# Patient Record
(demographics unavailable — no encounter records)

---

## 2024-11-04 NOTE — REVIEW OF SYSTEMS
[Feeling Tired] : feeling tired [Recent Weight Gain (___ Lbs)] : recent [unfilled] ~Ulb weight gain [Abdominal Pain] : abdominal pain [Vomiting] : vomiting [Diarrhea] : diarrhea [Negative] : Heme/Lymph

## 2024-11-04 NOTE — REASON FOR VISIT
[Follow-up] : a follow-up of an existing diagnosis [FreeTextEntry1] : recent colitis diagnosed in the emergency room

## 2024-11-26 NOTE — ASSESSMENT
[FreeTextEntry1] : Likely #Subungal hemorrhage, R and L 1st toes ddx also includes onychomycosis, but significantly lower on ddx as no subungal debris, xanthonychia or onychodystrophy chronic, improving - Discussed the likely nature and anticipated natural course of these benign lesions - Reassurance - Photos taken for monitoring; reviewed photos patient took on phone from about 2 months ago, appears as though lesion on R 1st toe has moved more distally nail plate relative to prior photo -- reassuring - Nail clipping obtained today to r/o onychomycosis out of precaution - Follow up 6 months for re-eval or sooner PRN if changing in morphology  #Seborrheic dermatitis, flare on ears  Chronic condition; flaring  - Reviewed the benign nature but chronic nature of this condition  - CONTINUE ketoconazole 2% shampoo to scalp, apply 2-3x per week, leave in for 5 mins at a time and rinse  - start keto cream on ears daily (also can use around nose). If no improvement at NV, can add in a TCS but patient nervous to use steroids  #Post-surgical scar, breasts chronic, stable - Discussed the likely nature and anticipated natural course - Reassurance - Recommended continuing with silicone scar sheets daily unless directed otherwise by patient's primary breast surgeon   Patient may follow up in 6 months, or sooner PRN if any changes, new or growing lesions

## 2024-11-26 NOTE — HISTORY OF PRESENT ILLNESS
[FreeTextEntry1] : RPV: spots of concern on toenails [de-identified] : Ms. ELISSA BROWN is a 38 year old F who presents for:   1. Spots of concern on toenails: non-itchy, non-painful spots of unspecified age, first noted >2 months ago which patient would like to have evaluated. Noted after removal of artificial nail 2. f/u seb derm: using ketoconazole with improvement  Skin Cancer Hx: No personal history of skin cancer

## 2024-11-26 NOTE — PHYSICAL EXAM
[FreeTextEntry3] : Focused skin exam performed  The relevant portions of the exam were performed today  AAOx3, NAD, well-appearing / pleasant Focused examination within normal limits with the exception of: - On the R 1st toe, an area of light-brown hyperpigmentation on the mid R lateral nail plate without any onychodystrophy; dermoscopy with yellow red globules; Distal onychoschizia - On the L 1st toe, an area of light-brown hyperpigmentation with globules - scalp improved but ears with scale - thin longitudinal hyperpigmented surgical scars on the b/l breasts

## 2024-11-29 NOTE — REVIEW OF SYSTEMS
[Diarrhea: Grade 0] : Diarrhea: Grade 0 [Joint Pain] : no joint pain [Joint Stiffness] : no joint stiffness [Muscle Pain] : no muscle pain [Muscle Weakness] : no muscle weakness [Skin Rash] : no skin rash [Skin Wound] : no skin wound [Negative] : Allergic/Immunologic [FreeTextEntry9] : pain over the scapula L  [de-identified] : occasional pain over the L axilla

## 2024-11-29 NOTE — REVIEW OF SYSTEMS
[Diarrhea: Grade 0] : Diarrhea: Grade 0 [Joint Pain] : no joint pain [Joint Stiffness] : no joint stiffness [Muscle Pain] : no muscle pain [Muscle Weakness] : no muscle weakness [Skin Rash] : no skin rash [Skin Wound] : no skin wound [Negative] : Allergic/Immunologic [FreeTextEntry9] : pain over the scapula L  [de-identified] : occasional pain over the L axilla

## 2024-11-29 NOTE — HISTORY OF PRESENT ILLNESS
[Disease: _____________________] : Disease: [unfilled] [T: ___] : T[unfilled] [N: ___] : N[unfilled] [AJCC Stage: ____] : AJCC Stage: [unfilled] [de-identified] : Age 37: left breast cancer Palpable breast mass: she had mammogram / sonogram done on 10/2023 which showed indeterminate calcifications in the left breast and multiple cysts of the upper outer quadrant measuring 1.6 x 1.3 x 1.8 cm correlating to pt's palpable area of concern. She had L upper outer breast cores biopsy done on 10/12/23 which showed invasive carcinoma with mixed ductal and lobular features, SBR 6/9, ER 80%, [place] 80%, Her2 negative (1). She underwent right prophylactic mastectomy and left mastectomy with SLNB with Dr Smith on 11/20/2023. The pathology showed left breast multifocal invasive moderately differentiated mammary carcinoma with ductal and lobular features: grade 2, measuring 11 mm, 9mm and 5 mm, negative SLN (0/2), and DCIS measuring 5 mm. Oncotype score was 15 which predicted 9 year distant recurrence of 4% on endocrine therapy.  [de-identified] : multifocal invasive moderately differentiated mammary carcinoma with ductal and lobular features ER 80%, [place] 80%, Her2 negativeand DCIS [de-identified] : [facility] genetic testing done on 10/2023: 9 gene panel: no actionable mutations tamoxifen 5/2024 (stopped due to cramping sensation and planned plastic surgery) restart 11/2024  [de-identified] : Since last evaluation, she has not resumed tamoxifen: plastic surgery was supposed to be October, then moved to November and then moved January 17th. She is worried about cramping sensation that she experienced previously. She did get magnesium supplement to help with this. Had E coli gastroenteritis after return from travel from work: had CT of the abd/ pelvis done 10/2024 and blood work. Currently resolved. She has occasional tenderness over the L axilla. Has pain over the L scapula: happened after exercise at the gym. Saw PCP who obtained Xrays and felt has not improved. PT was recommended but was not able to coordinate with work schedule.

## 2024-11-29 NOTE — PHYSICAL EXAM
[Fully active, able to carry on all pre-disease performance without restriction] : Status 0 - Fully active, able to carry on all pre-disease performance without restriction [Normal] : affect appropriate [de-identified] : B mastectomy with expander reconstruction; no palpable axillary LN  [de-identified] : tenderness on palpation over the L trapezius muscle: able to abduct LUE and adduct without any limitations

## 2024-11-29 NOTE — ASSESSMENT
[FreeTextEntry1] : She is a 37 y/o  BRCA negative F with left breast Stage I (T1N0) multifocal invasive moderately differentiated mammary carcinoma with ductal and lobular features ER 80%, SC 80%, Her2 negative s/p L mastectomy and SLNB along with R prophylactic mastectomy. We reviewed rationale for tamoxifen/ endocrine therapy and reviewed her concerns. Given plastic surgery has been delayed again, we reviewed restarting tamoxifen now and seeing how her body is tolerating therapy with magnesium support. We reviewed holding the tamoxifen 2 weeks prior to plastic surgery as abundance of caution to decrease any blood thickness risk. Questions answered to her satisfaction. She is agreeable with plan. We reviewed CT abd/ pelvis and blood work done in ED 10/2024 for colitis. Next follow up in 4 months but earlier if any new symptoms.

## 2024-11-29 NOTE — PHYSICAL EXAM
[Fully active, able to carry on all pre-disease performance without restriction] : Status 0 - Fully active, able to carry on all pre-disease performance without restriction [Normal] : affect appropriate [de-identified] : B mastectomy with expander reconstruction; no palpable axillary LN  [de-identified] : tenderness on palpation over the L trapezius muscle: able to abduct LUE and adduct without any limitations

## 2024-11-29 NOTE — ASSESSMENT
[FreeTextEntry1] : She is a 37 y/o  BRCA negative F with left breast Stage I (T1N0) multifocal invasive moderately differentiated mammary carcinoma with ductal and lobular features ER 80%, [place] 80%, Her2 negative s/p L mastectomy and SLNB along with R prophylactic mastectomy. We reviewed rationale for tamoxifen/ endocrine therapy and reviewed her concerns. Given plastic surgery has been delayed again, we reviewed restarting tamoxifen now and seeing how her body is tolerating therapy with magnesium support. We reviewed holding the tamoxifen 2 weeks prior to plastic surgery as abundance of caution to decrease any blood thickness risk. Questions answered to her satisfaction. She is agreeable with plan. We reviewed CT abd/ pelvis and blood work done in ED 10/2024 for colitis. Next follow up in 4 months but earlier if any new symptoms.

## 2024-11-29 NOTE — HISTORY OF PRESENT ILLNESS
[Disease: _____________________] : Disease: [unfilled] [T: ___] : T[unfilled] [N: ___] : N[unfilled] [AJCC Stage: ____] : AJCC Stage: [unfilled] [de-identified] : Age 37: left breast cancer Palpable breast mass: she had mammogram / sonogram done on 10/2023 which showed indeterminate calcifications in the left breast and multiple cysts of the upper outer quadrant measuring 1.6 x 1.3 x 1.8 cm correlating to pt's palpable area of concern. She had L upper outer breast cores biopsy done on 10/12/23 which showed invasive carcinoma with mixed ductal and lobular features, SBR 6/9, ER 80%, [place] 80%, Her2 negative (1). She underwent right prophylactic mastectomy and left mastectomy with SLNB with Dr Smith on 11/20/2023. The pathology showed left breast multifocal invasive moderately differentiated mammary carcinoma with ductal and lobular features: grade 2, measuring 11 mm, 9mm and 5 mm, negative SLN (0/2), and DCIS measuring 5 mm. Oncotype score was 15 which predicted 9 year distant recurrence of 4% on endocrine therapy.  [de-identified] : multifocal invasive moderately differentiated mammary carcinoma with ductal and lobular features ER 80%, CT 80%, Her2 negativeand DCIS [de-identified] : [facility] genetic testing done on 10/2023: 9 gene panel: no actionable mutations tamoxifen 5/2024 (stopped due to cramping sensation and planned plastic surgery) restart 11/2024  [de-identified] : Since last evaluation, she has not resumed tamoxifen: plastic surgery was supposed to be October, then moved to November and then moved January 17th. She is worried about cramping sensation that she experienced previously. She did get magnesium supplement to help with this. Had E coli gastroenteritis after return from travel from work: had CT of the abd/ pelvis done 10/2024 and blood work. Currently resolved. She has occasional tenderness over the L axilla. Has pain over the L scapula: happened after exercise at the gym. Saw PCP who obtained Xrays and felt has not improved. PT was recommended but was not able to coordinate with work schedule.

## 2024-11-29 NOTE — REASON FOR VISIT
[Follow-Up Visit] : a follow-up [FreeTextEntry2] : follow up for breast cancer: has resumed tamoxifen

## 2025-01-21 NOTE — HISTORY OF PRESENT ILLNESS
[FreeTextEntry1] :  Pt is a 38 year female s/p bilateral breast revision with nipple reconstruction and liposuction. DOS 1/17/25. Pt doing well. She reported feeling sore along her abdomen. Currently taking PO abx. No further complaints at this time. Denies fever, chills, pain, rash.

## 2025-01-31 NOTE — HISTORY OF PRESENT ILLNESS
[FreeTextEntry1] :  Pt is a 38 year female s/p bilateral breast revision with nipple reconstruction and liposuction. DOS 1/17/25. Patient reported noticing small opening along her left nipple incision and using mupirocin ointment on the area. No further complaints at this time. Denies fever, chills, pain, rash.

## 2025-01-31 NOTE — PHYSICAL EXAM
[de-identified] : bilateral breast implants in place, bilateral breast incision with prineo in place, left nipple incision mostly healed with small dehiscence, no drainage/bleeding noted, no sign of gross infection, post op swelling noted, liposuction incision along bilateral IMF healed

## 2025-02-04 NOTE — ASSESSMENT
[FreeTextEntry1] : will observe.  bloods drawn. to call next week for results. sonogram next visit. to return earlier if any change. patient has been given the opportunity to ask questions, and all of the patient's questions have been answered to their satisfaction

## 2025-02-04 NOTE — HISTORY OF PRESENT ILLNESS
[de-identified] : prior evaluation of thyroid nodule. cytologically benign. denies dysphagia, hoarseness or new lesions. ongoing breast reconstruction since last visit. I have reviewed all old and new data and available images.  recent sonogram stable.

## 2025-02-04 NOTE — PHYSICAL EXAM
[de-identified] : 3  cm left thyroid nodule, well circumscribed and mobile [Laryngoscopy Performed] : laryngoscopy was performed, see procedure section for findings [Midline] : located in midline position [Normal] : orientation to person, place, and time: normal

## 2025-02-24 NOTE — CONSULT LETTER
[Dear  ___] : Dear  [unfilled], [Consult Letter:] : I had the pleasure of evaluating your patient, [unfilled]. [Please see my note below.] : Please see my note below. [Consult Closing:] : Thank you very much for allowing me to participate in the care of this patient.  If you have any questions, please do not hesitate to contact me. [Sincerely,] : Sincerely, [FreeTextEntry3] : Bridgett Smith MD Breast Surgeon Division of Surgical Oncology Department of Surgery [address] [place] [address]  Tel: [phone] Fax: [phone] Email: [email or web address]

## 2025-02-24 NOTE — PAST MEDICAL HISTORY
[Menstruating] : The patient is menstruating [Menarche Age ____] : age at menarche was [unfilled] [History of Hormone Replacement Treatment] : has no history of hormone replacement treatment [Definite ___ (Date)] : the last menstrual period was [unfilled] [Regular Cycle Intervals] : have been regular [Total Preg ___] : G[unfilled] [Live Births ___] : P[unfilled]  [Age At Live Birth ___] : Age at live birth: [unfilled] [FreeTextEntry6] : none [FreeTextEntry7] : few months in the past, not currently [FreeTextEntry8] : 2 weeks

## 2025-02-24 NOTE — ASSESSMENT
[FreeTextEntry1] : Ms. ELISSA BROWN is a 38-year-old woman, referred for consultation by Dr. Trina Lino for Left breast invasive carcinoma, ductal and lobular, ER 80%, MT 80%, HER2 1+, s/p B/L skin-sparing mastectomies and L SLNB on 11/20/2023, here for a follow-up visit.   11/20/2023 s/p bilateral skin-sparing mastectomies and L SLNB, plastic reconstruction (TE) by Dr. Celestine Wahl. -R: benign -L :Multifocal IDC (3 foci: 11 mm, 9 mm, 5 mm), moderately differentiated (G2), ER+(71-80%), [place]+ (71-80%), HER2-(1+), DCIS (5 mm), LVI-. -L: 2 benign SLN -L: margins negative of invasive carcinoma and DCIS ER 80%, [place] 80%, HER2 1+ -pT1cN0, AJCC stage IA Oncotype DX 15  Had B/L revision w/ liposuction 5/14/2024 w/ Dr. Wahl Resumed Tamoxifen 11/2024 w/ Dr. Joyner Had bilateral breast revision with nipple reconstruction and liposuction 1/17/2025  Exam today ______  Plan: - F/u with PRS & med onc -RTO 6 months

## 2025-02-24 NOTE — HISTORY OF PRESENT ILLNESS
[FreeTextEntry1] : Ms. ELISSA BROWN is a 38-year-old woman, referred for consultation by Dr. Trina Lino for Left breast invasive carcinoma, ductal and lobular, ER 80%, [place] 80%, HER2 1+, s/p B/L skin-sparing mastectomies and L SLNB on 2023, here for a follow-up visit.   She is accompanied by her mother Sejal Figueroa and her aunt Claudette Thompson.  Prior history: The patient reports that she noted left breast pain over the summer along with a vague feeling of a lump in the area. She noted that it was too tender to really examine herself. She denies any visually obvious changes to the breast--denies any redness, rash, skin change, nipple change, or nipple discharge. She has never had any breast problems in the past.   Imaging: 10/05/2023 B/L DM (NW) heterogeneously dense breasts   - L UOQ innumerable calcs, layering, clustered, some with distortion -> stereo BX  - L UOQ multiple partially circumscribed masses (corresponds to cyst-palp)  - R neg  10/05/2023 B/L US  - R 10:00 N3 3 x 6 x 7 mm cyst with debris  - L UOQ multiple cyst 3:00 N3 1.6 x 1.3 x 1.8 cm cyst (palp)  - BR4B  10/12/2023 L stereo Bx  - L UOQ (tophat): invasive carcinoma with mixed ductal and lobular, G2; DCIS G2. ER 80%, [place] 80%, HER2 1+  PMH: FM, back pain PSH:  Meds: denies ALL: Aleve and Advil (brand name)-- but takes ibuprofen without any issue. SH: No tobacco. 2 EtOH/month FH: Maternal aunt breast cancer age 40s. Mat great aunt breast cancer unknown age. No other cancers GYN: Menarche 15. LMP 10/24/23, regular. . Age at first full-term pregnancy 27. BF x 2 weeks. OCP few months. Fert none. HRT none.  10/27/2023 Germline genetic testing ([facility])- negative  2023 Breast MRI (NW) -L bx proven malignancy with extensive enhancement extending from mid central to inferior outer spanning 10.3 cm, located 7 mm from the NAC and 2 cm from the chest wall -Mildy prominent bilateral axillary LNs -R neg -BR6  2023 s/p bilateral skin-sparing mastectomies and L SLNB, plastic reconstruction (TE) by Dr. Celestine Wahl. -R: benign -L breast: Multifocal IDC (3 foci: 11 mm, 9 mm, 5 mm), moderately differentiated (G2), ER+(71-80%), [place]+ (71-80%), HER2-(1+), DCIS (5 mm), LVI-. -L: 2 benign SLN -L: margins negative of invasive carcinoma and DCIS ER 80%, [place] 80%, HER2 1+ -pT1cN0, AJCC stage IA Oncotype DX 15  2023 (postop): The patient is doing well. No longer taking pain medications. Drains removed by PRS yesterday. No fevers/chills.  2024: She met with Dr. Franklyn Joyner from med onc and has not yet started on Tamoxifen (Oncotype DX 15). She is waiting to hear from plastics surgery regarding operative plan. Breasts are expanded to target size. Denies any breast pain or lumps.  2025: Has not yet started tamoxifen. She was previously taking 3x per week and noticed period cramping. She stopped after taking a few weeks. Recently met with Dr Joyner and was recommended to restart. Will plan to restart again soon. She is awaiting f/u with PRS for nipple reconstruction.  Interval History: She resumed Tamoxifen 2024 and is now s/p bilateral breast revision with nipple reconstruction and liposuction 2025.

## 2025-03-05 NOTE — ASSESSMENT
[FreeTextEntry1] : Ms. ELISSA BROWN is a 38-year-old woman, referred for consultation by Dr. Trina Lino for Left breast invasive carcinoma, ductal and lobular, ER 80%, [place] 80%, HER2 1+, s/p B/L skin-sparing mastectomies and L SLNB on 11/20/2023, here for a follow-up visit.   11/20/2023 s/p bilateral skin-sparing mastectomies and L SLNB, plastic reconstruction (TE) by Dr. Celestine Wahl. -R: benign -L :Multifocal IDC (3 foci: 11 mm, 9 mm, 5 mm), moderately differentiated (G2), ER+(71-80%), [place]+ (71-80%), HER2-(1+), DCIS (5 mm), LVI-. -L: 2 benign SLN -L: margins negative of invasive carcinoma and DCIS ER 80%, [place] 80%, HER2 1+ -pT1cN0, AJCC stage IA Oncotype DX 15  Had B/L revision w/ liposuction 5/14/2024 w/ Dr. Wahl Resumed Tamoxifen 11/2024 w/ Dr. Joyner Had bilateral breast revision with nipple reconstruction and liposuction 1/17/2025  Exam today no masses or lymphadenopathy   Plan: - F/u med onc, restart tamoxifen - f/u PRS for nipple tattoo - RTO 6 months

## 2025-03-05 NOTE — CONSULT LETTER
[Courtesy Letter:] : I had the pleasure of seeing your patient, [unfilled], in my office today. [FreeTextEntry3] : Bridgett Smith MD Breast Surgeon Division of Surgical Oncology Department of Surgery [address] [place] [address]  Tel: [phone] Fax: [phone] Email: [email or web address]

## 2025-03-05 NOTE — HISTORY OF PRESENT ILLNESS
[FreeTextEntry1] : Ms. ELISSA BROWN is a 38-year-old woman, referred for consultation by Dr. Trina Lino for Left breast invasive carcinoma, ductal and lobular, ER 80%, [place] 80%, HER2 1+, s/p B/L skin-sparing mastectomies and L SLNB on 2023, here for a follow-up visit.   She is accompanied by her mother Sejal Figueroa and her aunt Claudette Thompson.  Prior history: The patient reports that she noted left breast pain over the summer along with a vague feeling of a lump in the area. She noted that it was too tender to really examine herself. She denies any visually obvious changes to the breast--denies any redness, rash, skin change, nipple change, or nipple discharge. She has never had any breast problems in the past.   Imaging: 10/05/2023 B/L DM (NW) heterogeneously dense breasts   - L UOQ innumerable calcs, layering, clustered, some with distortion -> stereo BX  - L UOQ multiple partially circumscribed masses (corresponds to cyst-palp)  - R neg  10/05/2023 B/L US  - R 10:00 N3 3 x 6 x 7 mm cyst with debris  - L UOQ multiple cyst 3:00 N3 1.6 x 1.3 x 1.8 cm cyst (palp)  - BR4B  10/12/2023 L stereo Bx  - L UOQ (tophat): invasive carcinoma with mixed ductal and lobular, G2; DCIS G2. ER 80%, [place] 80%, HER2 1+  PMH: FM, back pain PSH:  Meds: denies ALL: Aleve and Advil (brand name)-- but takes ibuprofen without any issue. SH: No tobacco. 2 EtOH/month FH: Maternal aunt breast cancer age 40s. Mat great aunt breast cancer unknown age. No other cancers GYN: Menarche 15. LMP 10/24/23, regular. . Age at first full-term pregnancy 27. BF x 2 weeks. OCP few months. Fert none. HRT none.  10/27/2023 Germline genetic testing ([facility])- negative  2023 Breast MRI (NW) -L bx proven malignancy with extensive enhancement extending from mid central to inferior outer spanning 10.3 cm, located 7 mm from the NAC and 2 cm from the chest wall -Mildy prominent bilateral axillary LNs -R neg -BR6  2023 s/p bilateral skin-sparing mastectomies and L SLNB, plastic reconstruction (TE) by Dr. Celestine Wahl. -R: benign -L breast: Multifocal IDC (3 foci: 11 mm, 9 mm, 5 mm), moderately differentiated (G2), ER+(71-80%), [place]+ (71-80%), HER2-(1+), DCIS (5 mm), LVI-. -L: 2 benign SLN -L: margins negative of invasive carcinoma and DCIS ER 80%, [place] 80%, HER2 1+ -pT1cN0, AJCC stage IA Oncotype DX 15  2023 (postop): The patient is doing well. No longer taking pain medications. Drains removed by PRS yesterday. No fevers/chills.  2024: She met with Dr. Franklyn Joyner from med onc and has not yet started on Tamoxifen (Oncotype DX 15). She is waiting to hear from plastics surgery regarding operative plan. Breasts are expanded to target size. Denies any breast pain or lumps.  2025: Has not yet started tamoxifen. She was previously taking 3x per week and noticed period cramping. She stopped after taking a few weeks. Recently met with Dr Joyner and was recommended to restart. Will plan to restart again soon. She is awaiting f/u with PRS for nipple reconstruction.  Interval History: She resumed Tamoxifen 2024 and is now s/p bilateral breast revision with nipple reconstruction and liposuction 2025.  Stopped tamoxifen again in January due to the surgery. Was supposed to resume Feb, but has not done so yet. She reports having cramping while taking tamoxifen. She reports some change to her periods--is getting f/u with her GYN with US.

## 2025-03-05 NOTE — PAST MEDICAL HISTORY
[History of Hormone Replacement Treatment] : has no history of hormone replacement treatment [FreeTextEntry6] : none [FreeTextEntry7] : few months in the past, not currently [FreeTextEntry8] : 2 weeks

## 2025-03-05 NOTE — CONSULT LETTER
[Courtesy Letter:] : I had the pleasure of seeing your patient, [unfilled], in my office today. [FreeTextEntry3] : Bridgett Smith MD Breast Surgeon Division of Surgical Oncology Department of Surgery [address] [place] [address]  Tel: [phone] Fax: [phone] Email: [email or web address]

## 2025-03-05 NOTE — ASSESSMENT
[FreeTextEntry1] : Ms. ELISSA BROWN is a 38-year-old woman, referred for consultation by Dr. Trina Lino for Left breast invasive carcinoma, ductal and lobular, ER 80%, [place] 80%, HER2 1+, s/p B/L skin-sparing mastectomies and L SLNB on 11/20/2023, here for a follow-up visit.   11/20/2023 s/p bilateral skin-sparing mastectomies and L SLNB, plastic reconstruction (TE) by Dr. Celestine Wahl. -R: benign -L :Multifocal IDC (3 foci: 11 mm, 9 mm, 5 mm), moderately differentiated (G2), ER+(71-80%), [place]+ (71-80%), HER2-(1+), DCIS (5 mm), LVI-. -L: 2 benign SLN -L: margins negative of invasive carcinoma and DCIS ER 80%, [place] 80%, HER2 1+ -pT1cN0, AJCC stage IA Oncotype DX 15  Had B/L revision w/ liposuction 5/14/2024 w/ Dr. Wahl Resumed Tamoxifen 11/2024 w/ Dr. Joyner Had bilateral breast revision with nipple reconstruction and liposuction 1/17/2025  Exam today no masses or lymphadenopathy   Plan: - F/u med onc, restart tamoxifen - f/u PRS for nipple tattoo - RTO 6 months

## 2025-03-05 NOTE — PHYSICAL EXAM
[Normocephalic] : normocephalic [Atraumatic] : atraumatic [EOMI] : extra ocular movement intact [PERRL] : pupils equal, round and reactive to light [Sclera nonicteric] : sclera nonicteric [Supple] : supple [No Supraclavicular Adenopathy] : no supraclavicular adenopathy [No Cervical Adenopathy] : no cervical adenopathy [Examined in the supine and seated position] : examined in the supine and seated position [Symmetrical] : symmetrical [Bra Size: ___] : Bra Size: [unfilled] [Grade 1] : Ptosis Grade 1 [No dominant masses] : no dominant masses in right breast  [No dominant masses] : no dominant masses left breast [Breast Mass Right Breast ___cm] : no masses [Breast Mass Left Breast ___cm] : no masses [No Axillary Lymphadenopathy] : no left axillary lymphadenopathy [No Edema] : no edema [No Rashes] : no rashes [No Ulceration] : no ulceration [de-identified] : non-labored respirations  [de-identified] : B/L incisions well-healed. Skin is soft, viable, no masses

## 2025-03-05 NOTE — PHYSICAL EXAM
[Normocephalic] : normocephalic [Atraumatic] : atraumatic [EOMI] : extra ocular movement intact [PERRL] : pupils equal, round and reactive to light [Sclera nonicteric] : sclera nonicteric [Supple] : supple [No Supraclavicular Adenopathy] : no supraclavicular adenopathy [No Cervical Adenopathy] : no cervical adenopathy [Examined in the supine and seated position] : examined in the supine and seated position [Symmetrical] : symmetrical [Bra Size: ___] : Bra Size: [unfilled] [Grade 1] : Ptosis Grade 1 [No dominant masses] : no dominant masses in right breast  [No dominant masses] : no dominant masses left breast [Breast Mass Right Breast ___cm] : no masses [Breast Mass Left Breast ___cm] : no masses [No Axillary Lymphadenopathy] : no left axillary lymphadenopathy [No Edema] : no edema [No Rashes] : no rashes [No Ulceration] : no ulceration [de-identified] : non-labored respirations  [de-identified] : B/L incisions well-healed. Skin is soft, viable, no masses

## 2025-03-30 NOTE — HISTORY OF PRESENT ILLNESS
[Disease: _____________________] : Disease: [unfilled] [T: ___] : T[unfilled] [N: ___] : N[unfilled] [AJCC Stage: ____] : AJCC Stage: [unfilled] [de-identified] : Age 37: left breast cancer Palpable breast mass: she had mammogram / sonogram done on 10/2023 which showed indeterminate calcifications in the left breast and multiple cysts of the upper outer quadrant measuring 1.6 x 1.3 x 1.8 cm correlating to pt's palpable area of concern. 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 She underwent right prophylactic mastectomy and left mastectomy with SLNB with Dr Smith on 11/20/2023. The pathology showed left breast multifocal invasive moderately differentiated mammary carcinoma with ductal and lobular features: grade 2, measuring 11 mm, 9mm and 5 mm, negative SLN (0/2), and DCIS measuring 5 mm. Oncotype score was 15 which predicted 9 year distant recurrence of 4% on endocrine therapy.  [de-identified] : multifocal invasive moderately differentiated mammary carcinoma with ductal and lobular features ER 80%, [place] 80%, Her2 negativeand DCIS [de-identified] : [facility] genetic testing done on 10/2023: 9 gene panel: no actionable mutations tamoxifen 5/2024 (stopped due to cramping sensation and planned plastic surgery) restart 11/2024  [de-identified] : Since last visit, underwent bilateral breast revision with nipple reconstruction and liposuction on 1/17/2025. Aprils tentatively will have areolar tattoos done in April. She stopped Tamoxifen for surgery and reports resuming it daily about a week or two ago. Has been taking tamoxifen at night and Mg Citrate 400mg in the AM. She's not sure if the Mg is helping her symptoms. Complaints that her periods more painful on tamoxifen with increased cramping, diarrhea, vomiting, She has always had cramping and diarrhea but vomiting is new. Takes tylenol for pain. Last menstrual period 3/17/25. Due for US of uterus due to excess tissue in menses. Energy is good and has been working out regularly. Has some intermittent mild L neck pain and back pain, described as sharp/shocks once every couple of weeks.

## 2025-03-30 NOTE — PHYSICAL EXAM
[Fully active, able to carry on all pre-disease performance without restriction] : Status 0 - Fully active, able to carry on all pre-disease performance without restriction [Normal] : affect appropriate [de-identified] : no palpable masses, full ROM intact [de-identified] : Explained importance of sensitive exam and obtained verbal consent to perform the breast exam. Dr. Joyner was present as a chaperone. B mastectomy with reconstruction; no palpable axillary LN  [de-identified] : No pain currently, no gross deformities and no pain to palpation

## 2025-03-30 NOTE — HISTORY OF PRESENT ILLNESS
[Disease: _____________________] : Disease: [unfilled] [T: ___] : T[unfilled] [N: ___] : N[unfilled] [AJCC Stage: ____] : AJCC Stage: [unfilled] [de-identified] : Age 37: left breast cancer Palpable breast mass: she had mammogram / sonogram done on 10/2023 which showed indeterminate calcifications in the left breast and multiple cysts of the upper outer quadrant measuring 1.6 x 1.3 x 1.8 cm correlating to pt's palpable area of concern. 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 She underwent right prophylactic mastectomy and left mastectomy with SLNB with Dr Smith on 11/20/2023. The pathology showed left breast multifocal invasive moderately differentiated mammary carcinoma with ductal and lobular features: grade 2, measuring 11 mm, 9mm and 5 mm, negative SLN (0/2), and DCIS measuring 5 mm. Oncotype score was 15 which predicted 9 year distant recurrence of 4% on endocrine therapy.  [de-identified] : multifocal invasive moderately differentiated mammary carcinoma with ductal and lobular features ER 80%, [place] 80%, Her2 negativeand DCIS [de-identified] : [facility] genetic testing done on 10/2023: 9 gene panel: no actionable mutations tamoxifen 5/2024 (stopped due to cramping sensation and planned plastic surgery) restart 11/2024  [de-identified] : Since last visit, underwent bilateral breast revision with nipple reconstruction and liposuction on 1/17/2025. Aprils tentatively will have areolar tattoos done in April. She stopped Tamoxifen for surgery and reports resuming it daily about a week or two ago. Has been taking tamoxifen at night and Mg Citrate 400mg in the AM. She's not sure if the Mg is helping her symptoms. Complaints that her periods more painful on tamoxifen with increased cramping, diarrhea, vomiting, She has always had cramping and diarrhea but vomiting is new. Takes tylenol for pain. Last menstrual period 3/17/25. Due for US of uterus due to excess tissue in menses. Energy is good and has been working out regularly. Has some intermittent mild L neck pain and back pain, described as sharp/shocks once every couple of weeks.

## 2025-03-30 NOTE — PHYSICAL EXAM
[Fully active, able to carry on all pre-disease performance without restriction] : Status 0 - Fully active, able to carry on all pre-disease performance without restriction [Normal] : affect appropriate [de-identified] : no palpable masses, full ROM intact [de-identified] : Explained importance of sensitive exam and obtained verbal consent to perform the breast exam. Dr. Joyner was present as a chaperone. B mastectomy with reconstruction; no palpable axillary LN  [de-identified] : No pain currently, no gross deformities and no pain to palpation

## 2025-03-30 NOTE — REVIEW OF SYSTEMS
[Diarrhea: Grade 0] : Diarrhea: Grade 0 [Negative] : Allergic/Immunologic [Joint Pain] : no joint pain [Joint Stiffness] : no joint stiffness [Muscle Pain] : no muscle pain [Muscle Weakness] : no muscle weakness [Skin Rash] : no skin rash [Skin Wound] : no skin wound [FreeTextEntry9] : pain over the scapula L  [de-identified] : occasional pain over the L axilla

## 2025-03-30 NOTE — PHYSICAL EXAM
[Fully active, able to carry on all pre-disease performance without restriction] : Status 0 - Fully active, able to carry on all pre-disease performance without restriction [Normal] : affect appropriate [de-identified] : no palpable masses, full ROM intact [de-identified] : Explained importance of sensitive exam and obtained verbal consent to perform the breast exam. Dr. Joyner was present as a chaperone. B mastectomy with reconstruction; no palpable axillary LN  [de-identified] : No pain currently, no gross deformities and no pain to palpation

## 2025-03-30 NOTE — PHYSICAL EXAM
[Fully active, able to carry on all pre-disease performance without restriction] : Status 0 - Fully active, able to carry on all pre-disease performance without restriction [Normal] : affect appropriate [de-identified] : no palpable masses, full ROM intact [de-identified] : Explained importance of sensitive exam and obtained verbal consent to perform the breast exam. Dr. Joyner was present as a chaperone. B mastectomy with reconstruction; no palpable axillary LN  [de-identified] : No pain currently, no gross deformities and no pain to palpation

## 2025-03-30 NOTE — REVIEW OF SYSTEMS
[Diarrhea: Grade 0] : Diarrhea: Grade 0 [Negative] : Allergic/Immunologic [Joint Pain] : no joint pain [Joint Stiffness] : no joint stiffness [Muscle Pain] : no muscle pain [Muscle Weakness] : no muscle weakness [Skin Rash] : no skin rash [Skin Wound] : no skin wound [FreeTextEntry9] : pain over the scapula L  [de-identified] : occasional pain over the L axilla

## 2025-03-30 NOTE — ASSESSMENT
[FreeTextEntry1] : She is a 39 y/o  BRCA negative F with left breast Stage I (T1N0) multifocal invasive moderately differentiated mammary carcinoma with ductal and lobular features ER 80%, [place] 80%, Her2 negative s/p L mastectomy and SLNB along with R prophylactic mastectomy. We reviewed rationale for tamoxifen/ endocrine therapy and reviewed her concerns. She is now s/p plastic surgery reconstruction in January 2025 and resumed Tamoxifen a couple of weeks ago. She is taking it daily and while overall tolerating it, also having symptoms. Advised to take Mg with the tamoxifen at night to help ease cramping in AM and also advised taking an NSAID 24 hours prior to period onset to help minimize symptoms as well. Patient also discussed the desire to have a second child. We discussed that it is recommended to hold off child bearing after diagnosis for a minimum of 18-36 months.  Questions and concerns answered to her satisfaction. She is agreeable with plan. We reviewed CT abd/ pelvis and blood work done in ED 10/2024 for colitis and she will have repeat labs done with her PCP. Will follow up US uterus findings. Next follow up in 4 months but earlier if any new symptoms.  Patient seen and discussed with Dr. Joyner. Liya Jessica DO, MPH Medical Oncology Fellow

## 2025-03-30 NOTE — REVIEW OF SYSTEMS
[Diarrhea: Grade 0] : Diarrhea: Grade 0 [Negative] : Allergic/Immunologic [Joint Pain] : no joint pain [Joint Stiffness] : no joint stiffness [Muscle Pain] : no muscle pain [Muscle Weakness] : no muscle weakness [Skin Rash] : no skin rash [Skin Wound] : no skin wound [FreeTextEntry9] : pain over the scapula L  [de-identified] : occasional pain over the L axilla

## 2025-03-30 NOTE — ASSESSMENT
[FreeTextEntry1] : She is a 39 y/o  BRCA negative F with left breast Stage I (T1N0) multifocal invasive moderately differentiated mammary carcinoma with ductal and lobular features ER 80%, [place] 80%, Her2 negative s/p L mastectomy and SLNB along with R prophylactic mastectomy. We reviewed rationale for tamoxifen/ endocrine therapy and reviewed her concerns. She is now s/p plastic surgery reconstruction in January 2025 and resumed Tamoxifen a couple of weeks ago. She is taking it daily and while overall tolerating it, also having symptoms. Advised to take Mg with the tamoxifen at night to help ease cramping in AM and also advised taking an NSAID 24 hours prior to period onset to help minimize symptoms as well. Patient also discussed the desire to have a second child. We discussed that it is recommended to hold off child bearing after diagnosis for a minimum of 18-36 months.  Questions and concerns answered to her satisfaction. She is agreeable with plan. We reviewed CT abd/ pelvis and blood work done in ED 10/2024 for colitis and she will have repeat labs done with her PCP. Will follow up US uterus findings. Next follow up in 4 months but earlier if any new symptoms.  Patient seen and discussed with Dr. Joyner. Liya Jessica DO, MPH Medical Oncology Fellow

## 2025-03-30 NOTE — ASSESSMENT
[FreeTextEntry1] : She is a 39 y/o  BRCA negative F with left breast Stage I (T1N0) multifocal invasive moderately differentiated mammary carcinoma with ductal and lobular features ER 80%, [place] 80%, Her2 negative s/p L mastectomy and SLNB along with R prophylactic mastectomy. We reviewed rationale for tamoxifen/ endocrine therapy and reviewed her concerns. She is now s/p plastic surgery reconstruction in January 2025 and resumed Tamoxifen a couple of weeks ago. She is taking it daily and while overall tolerating it, also having symptoms. Advised to take Mg with the tamoxifen at night to help ease cramping in AM and also advised taking an NSAID 24 hours prior to period onset to help minimize symptoms as well. Patient also discussed the desire to have a second child. We discussed that it is recommended to hold off child bearing after diagnosis for a minimum of 18-36 months.  Questions and concerns answered to her satisfaction. She is agreeable with plan. We reviewed CT abd/ pelvis and blood work done in ED 10/2024 for colitis and she will have repeat labs done with her PCP. Will follow up US uterus findings. Next follow up in 4 months but earlier if any new symptoms.  Patient seen and discussed with Dr. Joyner. Liya Jessica DO, MPH Medical Oncology Fellow

## 2025-03-30 NOTE — REVIEW OF SYSTEMS
[Diarrhea: Grade 0] : Diarrhea: Grade 0 [Negative] : Allergic/Immunologic [Joint Pain] : no joint pain [Joint Stiffness] : no joint stiffness [Muscle Pain] : no muscle pain [Muscle Weakness] : no muscle weakness [Skin Rash] : no skin rash [Skin Wound] : no skin wound [FreeTextEntry9] : pain over the scapula L  [de-identified] : occasional pain over the L axilla

## 2025-03-30 NOTE — END OF VISIT
[Time Spent: ___ minutes] : I have spent [unfilled] minutes of time on the encounter which excludes teaching and separately reported services. [] : Fellow [FreeTextEntry3] : We reviewed her side effects on endocrine therapy and magnesium supplement. We reviewed goals of endocrine therapy. We reviewed signs and symptoms of breast cancer recurrence. No new signs or symptoms of recurrence. She is considering having another child. We reviewed POSITIVE study and pts stopping therapy 18 to 30 months after endocrine therapy. We reviewed stopping tamoxifen for at least 2 months before trying to conceive. We reviewed teratogenicity of tamoxifen and need to have contraception until off tamoxifen for 2 months.

## 2025-03-30 NOTE — HISTORY OF PRESENT ILLNESS
[Disease: _____________________] : Disease: [unfilled] [T: ___] : T[unfilled] [N: ___] : N[unfilled] [AJCC Stage: ____] : AJCC Stage: [unfilled] [de-identified] : Age 37: left breast cancer Palpable breast mass: she had mammogram / sonogram done on 10/2023 which showed indeterminate calcifications in the left breast and multiple cysts of the upper outer quadrant measuring 1.6 x 1.3 x 1.8 cm correlating to pt's palpable area of concern. She had L upper outer breast cores biopsy done on 10/12/23 which showed invasive carcinoma with mixed ductal and lobular features, SBR 6/9, ER 80%, [place] 80%, Her2 negative (1). She underwent right prophylactic mastectomy and left mastectomy with SLNB with Dr Smith on 11/20/2023. The pathology showed left breast multifocal invasive moderately differentiated mammary carcinoma with ductal and lobular features: grade 2, measuring 11 mm, 9mm and 5 mm, negative SLN (0/2), and DCIS measuring 5 mm. Oncotype score was 15 which predicted 9 year distant recurrence of 4% on endocrine therapy.  [de-identified] : multifocal invasive moderately differentiated mammary carcinoma with ductal and lobular features ER 80%, [place] 80%, Her2 negativeand DCIS [de-identified] : [facility] genetic testing done on 10/2023: 9 gene panel: no actionable mutations tamoxifen 5/2024 (stopped due to cramping sensation and planned plastic surgery) restart 11/2024  [de-identified] : Since last visit, underwent bilateral breast revision with nipple reconstruction and liposuction on 1/17/2025. Aprils tentatively will have areolar tattoos done in April. She stopped Tamoxifen for surgery and reports resuming it daily about a week or two ago. Has been taking tamoxifen at night and Mg Citrate 400mg in the AM. She's not sure if the Mg is helping her symptoms. Complaints that her periods more painful on tamoxifen with increased cramping, diarrhea, vomiting, She has always had cramping and diarrhea but vomiting is new. Takes tylenol for pain. Last menstrual period 3/17/25. Due for US of uterus due to excess tissue in menses. Energy is good and has been working out regularly. Has some intermittent mild L neck pain and back pain, described as sharp/shocks once every couple of weeks.

## 2025-03-30 NOTE — HISTORY OF PRESENT ILLNESS
[Disease: _____________________] : Disease: [unfilled] [T: ___] : T[unfilled] [N: ___] : N[unfilled] [AJCC Stage: ____] : AJCC Stage: [unfilled] [de-identified] : Age 37: left breast cancer Palpable breast mass: she had mammogram / sonogram done on 10/2023 which showed indeterminate calcifications in the left breast and multiple cysts of the upper outer quadrant measuring 1.6 x 1.3 x 1.8 cm correlating to pt's palpable area of concern. She had L upper outer breast cores biopsy done on 10/12/23 which showed invasive carcinoma with mixed ductal and lobular features, SBR 6/9, ER 80%, [place] 80%, Her2 negative (1). She underwent right prophylactic mastectomy and left mastectomy with SLNB with Dr Smith on 11/20/2023. The pathology showed left breast multifocal invasive moderately differentiated mammary carcinoma with ductal and lobular features: grade 2, measuring 11 mm, 9mm and 5 mm, negative SLN (0/2), and DCIS measuring 5 mm. Oncotype score was 15 which predicted 9 year distant recurrence of 4% on endocrine therapy.  [de-identified] : multifocal invasive moderately differentiated mammary carcinoma with ductal and lobular features ER 80%, [place] 80%, Her2 negativeand DCIS [de-identified] : [facility] genetic testing done on 10/2023: 9 gene panel: no actionable mutations tamoxifen 5/2024 (stopped due to cramping sensation and planned plastic surgery) restart 11/2024  [de-identified] : Since last visit, underwent bilateral breast revision with nipple reconstruction and liposuction on 1/17/2025. Aprils tentatively will have areolar tattoos done in April. She stopped Tamoxifen for surgery and reports resuming it daily about a week or two ago. Has been taking tamoxifen at night and Mg Citrate 400mg in the AM. She's not sure if the Mg is helping her symptoms. Complaints that her periods more painful on tamoxifen with increased cramping, diarrhea, vomiting, She has always had cramping and diarrhea but vomiting is new. Takes tylenol for pain. Last menstrual period 3/17/25. Due for US of uterus due to excess tissue in menses. Energy is good and has been working out regularly. Has some intermittent mild L neck pain and back pain, described as sharp/shocks once every couple of weeks.

## 2025-03-30 NOTE — ASSESSMENT
[FreeTextEntry1] : She is a 37 y/o  BRCA negative F with left breast Stage I (T1N0) multifocal invasive moderately differentiated mammary carcinoma with ductal and lobular features ER 80%, [place] 80%, Her2 negative s/p L mastectomy and SLNB along with R prophylactic mastectomy. We reviewed rationale for tamoxifen/ endocrine therapy and reviewed her concerns. She is now s/p plastic surgery reconstruction in January 2025 and resumed Tamoxifen a couple of weeks ago. She is taking it daily and while overall tolerating it, also having symptoms. Advised to take Mg with the tamoxifen at night to help ease cramping in AM and also advised taking an NSAID 24 hours prior to period onset to help minimize symptoms as well. Patient also discussed the desire to have a second child. We discussed that it is recommended to hold off child bearing after diagnosis for a minimum of 18-36 months.  Questions and concerns answered to her satisfaction. She is agreeable with plan. We reviewed CT abd/ pelvis and blood work done in ED 10/2024 for colitis and she will have repeat labs done with her PCP. Will follow up US uterus findings. Next follow up in 4 months but earlier if any new symptoms.  Patient seen and discussed with Dr. Joyner. Liya Jessica DO, MPH Medical Oncology Fellow

## 2025-05-02 NOTE — PROCEDURE
[FreeTextEntry1] : breast cancer [FreeTextEntry2] : bilateral NAC tattoo [FreeTextEntry3] : none [FreeTextEntry4] : none [FreeTextEntry5] : none [FreeTextEntry6] : Patient was prepped and cleaned with Hibiclens. Area was measured and marked bilaterally using stencil. All equipment was cleaned, prepped, and draped in a sterile fashion. Patient was properly positioned and tattooed. Bilateral NAC tattoo measured 3 x 3cm. At the conclusion of the tattoo, patient was cleaned with Hibiclens and dressed with a thin layer of bacitracin and tegaderm. After care instructions were discussed and given to patient. All questions and concerns were appropriately addressed. Follow up instructions were given to the patient as follows:   -Day 1-3 apply a thin layer of Bacitracin over tattoo and cover with tegaderm   -Day 4-10 apply a thin layer of Aquaphor over tattoo without covering   -Day 14 send photo to provider   -Follow up in 8 weeks for revision if necessary  -Follow up in 2 weeks

## 2025-05-06 NOTE — HISTORY OF PRESENT ILLNESS
[FreeTextEntry1] : Pt is a 38 year female s/p bilateral breast revision with nipple reconstruction and liposuction. DOS 1/17/25.  most recently s/p bilateral NAC tattoo DOS 4/22/25  Patient is doing well She is happy with tattoo results.  No further complaints at this time. Denies fever, chills, pain, rash.

## 2025-05-06 NOTE — PHYSICAL EXAM
[de-identified] : bilateral breast implants in appropriate position with NAC tattoo 3x3cm, implants soft

## 2025-05-06 NOTE — ASSESSMENT
[FreeTextEntry1] : Pt s/p bilateral breast revision with nipple reconstruction and liposuction. DOS 1/17/25.  Pt is doing well continue with moisturizer along tattoo can resume scaraway tape along breast incision F/u PRN

## 2025-06-26 NOTE — END OF VISIT
[FreeTextEntry3] : All medical record entries made by the Scribe were at my, Dr. Lauren Shikowitz-Behr, MD, direction and personally dictated by me on 06/24/2025. I have reviewed the chart and agree that the record accurately reflects my personal performance of the history, physical exam, assessment and plan. I have also personally directed, reviewed, and agreed with the chart.

## 2025-06-26 NOTE — PHYSICAL EXAM
[NI] : Normal [de-identified] : NAD, AxOx3  [de-identified] : nonlabored breathing  [de-identified] : normal HR [de-identified] : Mild skin overhang  Small umbilical hernia is present  Pfannensteil scar well healed   [de-identified] : no edema  [de-identified] : grossly intact  [de-identified] : normal affect

## 2025-06-26 NOTE — HISTORY OF PRESENT ILLNESS
[FreeTextEntry1] : ELISSA BROWN is a 39 year female complaining about lower abdominal skin. She states she is exercising but has found no change in the skin overhang. She has a history of weight loss and is hoping to lose 21 more pounds.    PMHx- Left breast cancer   PSHx- Bilateral mastectomy with implant based breast reconstruction,    Currently taking - tamoxifen   Allergies- Aleve, Advil (hives)   Never smoker   Work history- [employer]

## 2025-06-26 NOTE — PHYSICAL EXAM
[NI] : Normal [de-identified] : NAD, AxOx3  [de-identified] : nonlabored breathing  [de-identified] : normal HR [de-identified] : Mild skin overhang  Small umbilical hernia is present  Pfannensteil scar well healed   [de-identified] : no edema  [de-identified] : grossly intact  [de-identified] : normal affect

## 2025-06-26 NOTE — ADDENDUM
[FreeTextEntry1] :  I, Nicolas Manuel, documented this note as a scribe on behalf of Dr. Lauren Shikowitz-Behr, MD on 06/24/2025.

## 2025-07-28 NOTE — HEALTH RISK ASSESSMENT
[Yes] : Yes [2 - 4 times a month (2 pts)] : 2-4 times a month (2 points) [Never] : Never [# of Members in Household ___] :  household currently consist of [unfilled] member(s) [Employed] : employed [Significant Other] : lives with significant other [# Of Children ___] : has [unfilled] children [de-identified] : Varied diet [FreeTextEntry2] : Nicole [FreeTextEntry3] : 11 yo daughter-going 7th grade [de-identified] : Eyeglasses-summer 2024 [de-identified] : Dentist twice a year-deep cleaning

## 2025-07-28 NOTE — PLAN
[FreeTextEntry1] : Agreeable to STD testing. Discussed blood pressure monitoring and normal ranges for BP.  Keep BP log.  Advised elevated readings or symptoms require immediate evaluation.  Advised patient if they have a home blood pressure cuff, they may bring it with them to their next visit and compare machine readings.   Avoid added salt in diet.  Weight reviewed with patient-current BMI < 27.  Discussed side effects of GLP-1; thinking about pregnancy-advised not approved in pregnancy.  Discussed not candidate at this time.  Discussed possible weight management consult-only was able to have 1 telehealth session with Nutrition last year.   Seeing Heme/Onc this week. Will be following up with plastic surgeon   Episode of lightheadedness-advised to change position slowly-hydrate.   Lower abdominal discomfort-treated for UTI with GYN last month-recheck Urine.  Due for menstrual cycle.  EKG performed-sinus rhythm-LAE.  Recommending Cardio eval.  Advised to hydrate; change positions slowly. Discussed with Ms. BROWN precautions and advised to go to seek immediate medical re-evaluation if condition worsened.  Ms. ELISSA BROWN expressed understanding of the plan.

## 2025-07-28 NOTE — PHYSICAL EXAM
[No Acute Distress] : no acute distress [Well-Appearing] : well-appearing [Normal Sclera/Conjunctiva] : normal sclera/conjunctiva [PERRL] : pupils equal round and reactive to light [EOMI] : extraocular movements intact [Normal Oropharynx] : the oropharynx was normal [Normal TMs] : both tympanic membranes were normal [No Lymphadenopathy] : no lymphadenopathy [Thyroid Normal, No Nodules] : the thyroid was normal and there were no nodules present [No Edema] : there was no peripheral edema [No Extremity Clubbing/Cyanosis] : no extremity clubbing/cyanosis [Soft] : abdomen soft [Non-distended] : non-distended [No Masses] : no abdominal mass palpated [Normal Bowel Sounds] : normal bowel sounds [Normal] : affect was normal and insight and judgment were intact [de-identified] : midline lower abdominal tenderness to palpation

## 2025-07-28 NOTE — REVIEW OF SYSTEMS
[Fever] : no fever [Chills] : no chills [Earache] : no earache [Nasal Discharge] : no nasal discharge [Sore Throat] : no sore throat [Chest Pain] : no chest pain [Shortness Of Breath] : no shortness of breath [Wheezing] : no wheezing [Nausea] : no nausea [Constipation] : no constipation [Diarrhea] : diarrhea [Vomiting] : no vomiting [Dysuria] : no dysuria [Headache] : no headache [Dizziness] : no dizziness [Suicidal] : not suicidal [Depression] : no depression [FreeTextEntry5] : felt heart racing once with med interaction one time; having to stop with exercise-sometimes with changing positions [de-identified] : 7 hours of sleep per night

## 2025-07-28 NOTE — HISTORY OF PRESENT ILLNESS
[FreeTextEntry1] : Annual Physical [de-identified] : Annual Physical Seeing Heme/Onc this week.   -Started taking L-Carnitine-supplement-trying to help for weight.  Did telephone visit with nutritionist last year.  Concerned that BP is elevated in the doctor's offices.   Allergies-Motrin-vomitting Can take Tylenol  LMP-06/23/25-feels cramping  May/June 2025 GYN visit-[facility] Spring 2025 GYN-not due for pap smear  Reconstruction-completed.  Thinking about maybe having a baby with partner.  Following with plastics consult.  Was considering cosmetic surgery-but thinking to hold off if pursuing pregnancy. Has been going to gym consistently.  3-4 days a week.  . Since February.   Drinking about 1 bottle of water per day. Interested in wegovy.

## 2025-07-28 NOTE — PHYSICAL EXAM
[No Acute Distress] : no acute distress [Well-Appearing] : well-appearing [Normal Sclera/Conjunctiva] : normal sclera/conjunctiva [PERRL] : pupils equal round and reactive to light [EOMI] : extraocular movements intact [Normal Oropharynx] : the oropharynx was normal [Normal TMs] : both tympanic membranes were normal [No Lymphadenopathy] : no lymphadenopathy [Thyroid Normal, No Nodules] : the thyroid was normal and there were no nodules present [No Edema] : there was no peripheral edema [No Extremity Clubbing/Cyanosis] : no extremity clubbing/cyanosis [Soft] : abdomen soft [Non-distended] : non-distended [No Masses] : no abdominal mass palpated [Normal Bowel Sounds] : normal bowel sounds [Normal] : affect was normal and insight and judgment were intact [de-identified] : midline lower abdominal tenderness to palpation

## 2025-07-28 NOTE — HISTORY OF PRESENT ILLNESS
[FreeTextEntry1] : Annual Physical [de-identified] : Annual Physical Seeing Heme/Onc this week.   -Started taking L-Carnitine-supplement-trying to help for weight.  Did telephone visit with nutritionist last year.  Concerned that BP is elevated in the doctor's offices.   Allergies-Motrin-vomitting Can take Tylenol  LMP-06/23/25-feels cramping  May/June 2025 GYN visit-[facility] Spring 2025 GYN-not due for pap smear  Reconstruction-completed.  Thinking about maybe having a baby with partner.  Following with plastics consult.  Was considering cosmetic surgery-but thinking to hold off if pursuing pregnancy. Has been going to gym consistently.  3-4 days a week.  . Since February.   Drinking about 1 bottle of water per day. Interested in wegovy.

## 2025-07-28 NOTE — HEALTH RISK ASSESSMENT
[Yes] : Yes [2 - 4 times a month (2 pts)] : 2-4 times a month (2 points) [Never] : Never [# of Members in Household ___] :  household currently consist of [unfilled] member(s) [Employed] : employed [Significant Other] : lives with significant other [# Of Children ___] : has [unfilled] children [de-identified] : Varied diet [FreeTextEntry2] : Nicole [FreeTextEntry3] : 13 yo daughter-going 7th grade [de-identified] : Eyeglasses-summer 2024 [de-identified] : Dentist twice a year-deep cleaning

## 2025-07-28 NOTE — REVIEW OF SYSTEMS
[Fever] : no fever [Chills] : no chills [Earache] : no earache [Nasal Discharge] : no nasal discharge [Sore Throat] : no sore throat [Chest Pain] : no chest pain [Shortness Of Breath] : no shortness of breath [Wheezing] : no wheezing [Nausea] : no nausea [Constipation] : no constipation [Diarrhea] : diarrhea [Vomiting] : no vomiting [Dysuria] : no dysuria [Headache] : no headache [Dizziness] : no dizziness [Suicidal] : not suicidal [Depression] : no depression [FreeTextEntry5] : felt heart racing once with med interaction one time; having to stop with exercise-sometimes with changing positions [de-identified] : 7 hours of sleep per night